# Patient Record
Sex: FEMALE | Race: WHITE | NOT HISPANIC OR LATINO
[De-identification: names, ages, dates, MRNs, and addresses within clinical notes are randomized per-mention and may not be internally consistent; named-entity substitution may affect disease eponyms.]

---

## 2017-04-24 ENCOUNTER — RESULT REVIEW (OUTPATIENT)
Age: 37
End: 2017-04-24

## 2017-05-26 PROBLEM — Z00.00 ENCOUNTER FOR PREVENTIVE HEALTH EXAMINATION: Noted: 2017-05-26

## 2017-05-31 ENCOUNTER — OUTPATIENT (OUTPATIENT)
Dept: OUTPATIENT SERVICES | Facility: HOSPITAL | Age: 37
LOS: 1 days | End: 2017-05-31
Payer: COMMERCIAL

## 2017-05-31 ENCOUNTER — APPOINTMENT (OUTPATIENT)
Dept: CT IMAGING | Facility: IMAGING CENTER | Age: 37
End: 2017-05-31

## 2017-05-31 DIAGNOSIS — Z00.8 ENCOUNTER FOR OTHER GENERAL EXAMINATION: ICD-10-CM

## 2017-05-31 PROCEDURE — 74177 CT ABD & PELVIS W/CONTRAST: CPT

## 2018-02-06 ENCOUNTER — APPOINTMENT (OUTPATIENT)
Dept: OBGYN | Facility: CLINIC | Age: 38
End: 2018-02-06

## 2018-02-15 ENCOUNTER — OUTPATIENT (OUTPATIENT)
Dept: OUTPATIENT SERVICES | Facility: HOSPITAL | Age: 38
LOS: 1 days | End: 2018-02-15
Payer: MEDICAID

## 2018-02-15 ENCOUNTER — APPOINTMENT (OUTPATIENT)
Dept: RADIOLOGY | Facility: IMAGING CENTER | Age: 38
End: 2018-02-15
Payer: MEDICAID

## 2018-02-15 DIAGNOSIS — Z00.8 ENCOUNTER FOR OTHER GENERAL EXAMINATION: ICD-10-CM

## 2018-02-15 PROCEDURE — 72070 X-RAY EXAM THORAC SPINE 2VWS: CPT | Mod: 26

## 2018-02-15 PROCEDURE — 71046 X-RAY EXAM CHEST 2 VIEWS: CPT

## 2018-02-15 PROCEDURE — 71046 X-RAY EXAM CHEST 2 VIEWS: CPT | Mod: 26

## 2018-02-15 PROCEDURE — 72070 X-RAY EXAM THORAC SPINE 2VWS: CPT

## 2019-12-10 ENCOUNTER — APPOINTMENT (OUTPATIENT)
Dept: ORTHOPEDIC SURGERY | Facility: CLINIC | Age: 39
End: 2019-12-10
Payer: MEDICAID

## 2019-12-10 VITALS
BODY MASS INDEX: 21.85 KG/M2 | DIASTOLIC BLOOD PRESSURE: 76 MMHG | HEIGHT: 64 IN | HEART RATE: 80 BPM | SYSTOLIC BLOOD PRESSURE: 128 MMHG | WEIGHT: 128 LBS

## 2019-12-10 DIAGNOSIS — Z78.9 OTHER SPECIFIED HEALTH STATUS: ICD-10-CM

## 2019-12-10 DIAGNOSIS — M25.512 PAIN IN RIGHT SHOULDER: ICD-10-CM

## 2019-12-10 DIAGNOSIS — M25.511 PAIN IN RIGHT SHOULDER: ICD-10-CM

## 2019-12-10 PROCEDURE — 73030 X-RAY EXAM OF SHOULDER: CPT | Mod: 50

## 2019-12-10 PROCEDURE — 20610 DRAIN/INJ JOINT/BURSA W/O US: CPT | Mod: LT

## 2019-12-10 PROCEDURE — 99204 OFFICE O/P NEW MOD 45 MIN: CPT | Mod: 25

## 2019-12-10 RX ORDER — MELOXICAM 15 MG/1
15 TABLET ORAL DAILY
Qty: 30 | Refills: 1 | Status: ACTIVE | COMMUNITY
Start: 2019-12-10 | End: 1900-01-01

## 2019-12-10 RX ORDER — LIDOCAINE HYDROCHLORIDE 10 MG/ML
1 INJECTION, SOLUTION INFILTRATION; PERINEURAL
Qty: 0 | Refills: 0 | Status: COMPLETED | OUTPATIENT
Start: 2019-12-10

## 2019-12-10 RX ORDER — METHYLPRED ACET/NACL,ISO-OS/PF 40 MG/ML
40 VIAL (ML) INJECTION
Qty: 1 | Refills: 0 | Status: COMPLETED | OUTPATIENT
Start: 2019-12-10

## 2019-12-10 RX ADMIN — METHYLPREDNISOLONE ACETATE MG/ML: 40 INJECTION, SUSPENSION INTRA-ARTICULAR; INTRALESIONAL; INTRAMUSCULAR; SOFT TISSUE at 00:00

## 2019-12-10 RX ADMIN — LIDOCAINE HYDROCHLORIDE %: 10 INJECTION, SOLUTION INFILTRATION; PERINEURAL at 00:00

## 2020-05-28 ENCOUNTER — APPOINTMENT (OUTPATIENT)
Dept: ORTHOPEDIC SURGERY | Facility: CLINIC | Age: 40
End: 2020-05-28
Payer: COMMERCIAL

## 2020-05-28 VITALS — SYSTOLIC BLOOD PRESSURE: 125 MMHG | HEART RATE: 66 BPM | DIASTOLIC BLOOD PRESSURE: 80 MMHG | TEMPERATURE: 98.1 F

## 2020-05-28 DIAGNOSIS — M23.92 UNSPECIFIED INTERNAL DERANGEMENT OF LEFT KNEE: ICD-10-CM

## 2020-05-28 PROCEDURE — 73564 X-RAY EXAM KNEE 4 OR MORE: CPT | Mod: LT

## 2020-05-28 PROCEDURE — 99214 OFFICE O/P EST MOD 30 MIN: CPT

## 2020-05-28 RX ORDER — MELOXICAM 15 MG/1
15 TABLET ORAL DAILY
Qty: 30 | Refills: 0 | Status: ACTIVE | COMMUNITY
Start: 2020-05-28 | End: 1900-01-01

## 2020-06-04 NOTE — HISTORY OF PRESENT ILLNESS
[de-identified] : This patient presents today for follow-up regarding internal derangement of the left knee.  The patient had a steroid injection to the left knee in December of last year.  That gave her significant relief of symptoms for about 4 months.  Her symptoms recurred about 1 month ago and have worsened.  Pain level is 5 out of 10 and worsens with activity and changes in the weather.  Pain is improved with rest.  Patient takes Advil intermittently with some improvement.

## 2020-06-04 NOTE — DISCUSSION/SUMMARY
[de-identified] : This patient presents for follow-up regarding internal derangement of the left knee.  She is having recurrence of her symptoms over the last month.  Physical exam reveals evidence of patellofemoral syndrome of the left knee.  I recommended a course of Mobic for 2 weeks as well as a course of physical therapy for the next month.  I will see the patient back in 1 month for reevaluation.  If symptoms do not improve I would consider a course of Visco supplementation at that time.  Instructions were given regarding ice analgesics and modification of activities.\par \par I recommended a course of Mobic, 15mg per day for the next 2 weeks.  The patient was given a prescription for the Mobic with directions to take it once daily with the first meal of the day.  They were instructed to stop the medicine and call the office if there are any adverse reactions to the medicine.

## 2020-06-04 NOTE — PHYSICAL EXAM
[de-identified] : The patient appears well nourished  and in no apparent distress.  The patient is alert and oriented to person, place, and time.   Affect and mood appear normal.    The head is normocephalic and atraumatic.  The eyes reveal normal sclera and extra ocular muscles are intact.   The neck appears normal with no jugular venous distention or masses noted.   Skin shows normal turgor with no evidence of eczema or psoriasis.  No respiratory distress noted.  The patient ambulates with a normal gait.\par \par The left knee has normal range of motion. There is no pain with terminal flexion. There is some mild tenderness over the medial joint.  There is crepitus with range of motion.. . There is a negative [facility] sign. There is no effusion. There is no soft tissue swelling, warmth, or erythema.   There is a negative Lachman sign.  There is no instability to varus/valgus stress or anterior/posterior drawer.  There is normal strength in the in the quadriceps and hamstring muscles.  Sensation is intact to the lower estremity. There are normal pulses distally and good capillary refill. No edema or lymphadenopathy noted.  \par \par The right and left shoulders have full range of motion.  There is no soft tissue swelling.  There is no tenderness to palpation. There is no eccyhmosis.  There is no erythema or warmth.   Thee is a negative impingement sign.  There is a negative Hawkings sign.   Rotator cuff strength is normal.  Negative load and shift test.  Negative anterior and posterior drawer test.   Negative apprehension sign.  No lymphadenopathy or edema is noted.  Pulses and capillary refill are normal.  Sensation is normal.    \par  [de-identified] : AP, lateral, tunnel, and merchant views of the left knee were obtained.  Medial and lateral joint spaces are well maintained.  There is patella natan with mild lateral patellofemoral joint narrowing.  The alignment of the knee is normal.  No fractures or dislocations are noted.

## 2020-06-29 ENCOUNTER — APPOINTMENT (OUTPATIENT)
Dept: ORTHOPEDIC SURGERY | Facility: CLINIC | Age: 40
End: 2020-06-29
Payer: MEDICAID

## 2020-06-29 VITALS
HEIGHT: 64 IN | WEIGHT: 128 LBS | DIASTOLIC BLOOD PRESSURE: 76 MMHG | SYSTOLIC BLOOD PRESSURE: 115 MMHG | HEART RATE: 71 BPM | TEMPERATURE: 97.5 F | BODY MASS INDEX: 21.85 KG/M2

## 2020-06-29 PROCEDURE — 99213 OFFICE O/P EST LOW 20 MIN: CPT

## 2020-06-30 RX ORDER — HYALURONATE SODIUM 30 MG/2 ML
30 SYRINGE (ML) INTRAARTICULAR
Qty: 4 | Refills: 0 | Status: ACTIVE | OUTPATIENT
Start: 2020-06-30

## 2020-06-30 NOTE — DISCUSSION/SUMMARY
[de-identified] : The patient presents today for evaluation regarding patellofemoral syndrome of the left knee and internal derangement.  Patient continues to have pain about the knee despite meloxicam and physical therapy.  The patient likely has some early patellofemoral arthritis in conjunction with the patellofemoral stress syndrome of the left knee.  I recommended a course of Visco supplementation for the patient.  We will request this from the patient's insurance carrier and call her when the medicine is received to begin treatment in the office.  In the meantime, she can continue physical therapy.

## 2020-06-30 NOTE — PHYSICAL EXAM
[de-identified] : The patient appears well nourished  and in no apparent distress.  The patient is alert and oriented to person, place, and time.   Affect and mood appear normal.    The head is normocephalic and atraumatic.  The eyes reveal normal sclera and extra ocular muscles are intact.   The neck appears normal with no jugular venous distention or masses noted.   Skin shows normal turgor with no evidence of eczema or psoriasis.  No respiratory distress noted.  The patient ambulates with a normal gait.\par \par The left knee has normal range of motion. There is no pain with terminal flexion.  There is tenderness over the medial joint and over the lateral aspect of the patellofemoral joint.  There is crepitus with range of motion.. . There is a negative Obie sign. There is no effusion. There is no soft tissue swelling, warmth, or erythema.   There is a negative Lachman sign.  There is no instability to varus/valgus stress or anterior/posterior drawer.  There is normal strength in the in the quadriceps and hamstring muscles.  Sensation is intact to the lower estremity. There are normal pulses distally and good capillary refill. No edema or lymphadenopathy noted.  \par \par The right and left shoulders have full range of motion.  There is no soft tissue swelling.  There is no tenderness to palpation. There is no eccyhmosis.  There is no erythema or warmth.   Thee is a negative impingement sign.  There is a negative Hawkings sign.   Rotator cuff strength is normal.  Negative load and shift test.  Negative anterior and posterior drawer test.   Negative apprehension sign.  No lymphadenopathy or edema is noted.  Pulses and capillary refill are normal.  Sensation is normal.    \par

## 2020-06-30 NOTE — REASON FOR VISIT
[Follow-Up Visit] : a follow-up visit for [FreeTextEntry2] : Internal derangement/patellofemoral stress syndrome of left knee.

## 2020-06-30 NOTE — HISTORY OF PRESENT ILLNESS
[de-identified] : This patient presents today for follow-up regarding left knee patellofemoral syndrome and internal derangement.  She is taking the meloxicam once a day and hard to tell whether or not she is getting relief.  Pain is intermittent.  Pain level 4-5 out of 10.  The patient states that the pain is worse with activity and improved by rest. The patient denies numbness and tingling, radicular symptoms, or bowel/bladder dysfunction.  Patient notes stiffness about the left knee as well.  She is gone for 8 sessions of physical therapy thus far.

## 2020-11-24 ENCOUNTER — APPOINTMENT (OUTPATIENT)
Dept: ORTHOPEDIC SURGERY | Facility: CLINIC | Age: 40
End: 2020-11-24
Payer: MEDICAID

## 2020-11-24 VITALS — TEMPERATURE: 97.5 F | HEART RATE: 82 BPM | SYSTOLIC BLOOD PRESSURE: 122 MMHG | DIASTOLIC BLOOD PRESSURE: 76 MMHG

## 2020-11-24 PROCEDURE — 20610 DRAIN/INJ JOINT/BURSA W/O US: CPT | Mod: LT

## 2020-11-24 RX ORDER — HYALURONATE SODIUM 30 MG/2 ML
30 SYRINGE (ML) INTRAARTICULAR
Refills: 0 | Status: COMPLETED | OUTPATIENT
Start: 2020-11-24

## 2020-11-24 RX ADMIN — Medication 2 MG/2ML: at 00:00

## 2020-12-02 ENCOUNTER — APPOINTMENT (OUTPATIENT)
Dept: ORTHOPEDIC SURGERY | Facility: CLINIC | Age: 40
End: 2020-12-02
Payer: MEDICAID

## 2020-12-02 VITALS — DIASTOLIC BLOOD PRESSURE: 81 MMHG | TEMPERATURE: 97.7 F | SYSTOLIC BLOOD PRESSURE: 117 MMHG | HEART RATE: 71 BPM

## 2020-12-02 PROCEDURE — 20610 DRAIN/INJ JOINT/BURSA W/O US: CPT | Mod: LT

## 2020-12-02 PROCEDURE — 99072 ADDL SUPL MATRL&STAF TM PHE: CPT

## 2020-12-03 RX ORDER — HYALURONATE SODIUM 30 MG/2 ML
30 SYRINGE (ML) INTRAARTICULAR
Refills: 0 | Status: COMPLETED | OUTPATIENT
Start: 2020-12-03

## 2020-12-03 RX ADMIN — Medication 2 MG/2ML: at 00:00

## 2020-12-08 ENCOUNTER — APPOINTMENT (OUTPATIENT)
Dept: ORTHOPEDIC SURGERY | Facility: CLINIC | Age: 40
End: 2020-12-08
Payer: COMMERCIAL

## 2020-12-08 VITALS
SYSTOLIC BLOOD PRESSURE: 120 MMHG | WEIGHT: 128 LBS | DIASTOLIC BLOOD PRESSURE: 75 MMHG | TEMPERATURE: 97.3 F | HEIGHT: 64 IN | HEART RATE: 75 BPM | BODY MASS INDEX: 21.85 KG/M2

## 2020-12-08 DIAGNOSIS — M22.2X2 PATELLOFEMORAL DISORDERS, LEFT KNEE: ICD-10-CM

## 2020-12-08 PROCEDURE — 99072 ADDL SUPL MATRL&STAF TM PHE: CPT

## 2020-12-08 PROCEDURE — 20610 DRAIN/INJ JOINT/BURSA W/O US: CPT | Mod: LT

## 2022-02-16 ENCOUNTER — APPOINTMENT (OUTPATIENT)
Dept: ORTHOPEDIC SURGERY | Facility: CLINIC | Age: 42
End: 2022-02-16
Payer: COMMERCIAL

## 2022-02-16 VITALS
SYSTOLIC BLOOD PRESSURE: 142 MMHG | HEART RATE: 74 BPM | BODY MASS INDEX: 21.85 KG/M2 | DIASTOLIC BLOOD PRESSURE: 86 MMHG | WEIGHT: 128 LBS | HEIGHT: 64 IN

## 2022-02-16 DIAGNOSIS — M54.9 DORSALGIA, UNSPECIFIED: ICD-10-CM

## 2022-02-16 DIAGNOSIS — M47.812 SPONDYLOSIS W/OUT MYELOPATHY OR RADICULOPATHY, CERVICAL REGION: ICD-10-CM

## 2022-02-16 DIAGNOSIS — M54.2 CERVICALGIA: ICD-10-CM

## 2022-02-16 PROCEDURE — 99204 OFFICE O/P NEW MOD 45 MIN: CPT

## 2022-02-16 RX ORDER — OMEPRAZOLE 40 MG/1
40 CAPSULE, DELAYED RELEASE ORAL
Qty: 30 | Refills: 1 | Status: ACTIVE | COMMUNITY
Start: 2022-02-16 | End: 1900-01-01

## 2022-02-16 RX ORDER — IBUPROFEN 800 MG/1
800 TABLET, FILM COATED ORAL
Qty: 90 | Refills: 0 | Status: ACTIVE | COMMUNITY
Start: 2022-02-16 | End: 1900-01-01

## 2022-02-17 NOTE — DISCUSSION/SUMMARY
[Medication Risks Reviewed] : Medication risks reviewed [de-identified] : I recommended that she stop with his pain management doctor.  We discussed daily activities she needs to avoid which may well be aggravating and propagating the symptoms.  She will use moist heat.  She has been started on ibuprofen 800 mg 3 times a day as a nonsteroidal anti-inflammatory.  She will call if there are problems with the medication or worsening of her symptoms and I will see her for follow-up in 4 weeks.

## 2022-02-17 NOTE — HISTORY OF PRESENT ILLNESS
[de-identified] : This 41-year-old woman started with symptoms of neck pain 3 years ago.  The symptoms were intermittent.  At one point she had a 4-month course of physical therapy without benefit.  The left neck pain over the last year or so has gotten worse and radiates towards the left shoulder but not down the arm.  She has not had associated neurologic symptoms of numbness, paresthesias or weakness.  There is no Valsalva effect.  There have been no changes in her gait or balance.  She gets occasional night pain.  She came under the care of a pain management doctor and was using Voltaren cream and lidocaine patches as well as Tylenol.  She then had an MRI of the cervical spine and had 2 epidurals without help.  She has been getting multiple local injections in a few days ago after the injections the pain became markedly worse in the left neck radiating towards the shoulder.  She does have some daily habits that may well aggravate and propagate neck related pain including watching TV in bed, sleeping with 2 or more pillows, lying on the couch watching TV and falling asleep on the couch, carrying an 8 pound shoulder bag and holding the phone with her head while she talks. [Pain Location] : pain [Worsening] : worsening [8] : a maximum pain level of 8/10

## 2022-02-17 NOTE — PHYSICAL EXAM
[de-identified] : She is fully alert and oriented with a normal mood and affect.  She is in no acute distress as I take the history.  She ambulates with a normal gait including tiptoe and heel walking.  There is no evidence of unsteadiness or spasticity.  There are no cutaneous abnormalities or palpable bony defects of the spine.  There is no evidence of shortness of breath or respiratory distress.  Forward flexion of the spine is painless.  There is no paravertebral muscle spasm, sciatic notch tenderness or trochanteric tenderness.  Her lower extremity neurological examination revealed 1-2+ symmetrical reflexes with downgoing toes.  Motor power is normal manual testing in all lower extremity groups and sensation is normal to light touch in all dermatomes.  Straight leg raising is negative to 90 degrees in the sitting position.  Her hips and her knees have a full and painless range of motion with normal stability.  Vascular examination shows no evidence of varicosities and there is no lymphedema.  There are no cutaneous abnormalities of the upper or lower extremities.  Her upper extremity neurological examination revealed 1+ symmetrical reflexes.  Motor power is normal to manual testing in all upper extremity groups and sensation is normal to light touch in all dermatomes.  Shoulder range of motion is full, painless and symmetrical.  Range of motion of the cervical spine showed flexion with the chin reaching to within 2 fingerbreadths of the chest, extension of 80 degrees with rotation of 75 degrees bilaterally and tilt of 30 degrees bilaterally with some discomfort at the extremes. [de-identified] : I reviewed an outside MRI of the cervical spine that revealed some minor disc bulge at 1 level.  There is no evidence of any significant spinal cord or nerve root compression.  Sagittal alignment is normal and there are no destructive changes. English

## 2022-02-17 NOTE — REVIEW OF SYSTEMS
[Negative] : Genitourinary [FreeTextEntry7] : She had a stomach ulcer 5 or 6 years ago but has not had GI problems for many years.

## 2022-03-16 ENCOUNTER — APPOINTMENT (OUTPATIENT)
Dept: ORTHOPEDIC SURGERY | Facility: CLINIC | Age: 42
End: 2022-03-16

## 2022-04-22 ENCOUNTER — RX RENEWAL (OUTPATIENT)
Age: 42
End: 2022-04-22

## 2023-04-12 ENCOUNTER — APPOINTMENT (OUTPATIENT)
Dept: ULTRASOUND IMAGING | Facility: CLINIC | Age: 43
End: 2023-04-12
Payer: COMMERCIAL

## 2023-04-12 ENCOUNTER — OUTPATIENT (OUTPATIENT)
Dept: OUTPATIENT SERVICES | Facility: HOSPITAL | Age: 43
LOS: 1 days | End: 2023-04-12
Payer: COMMERCIAL

## 2023-04-12 DIAGNOSIS — Z00.8 ENCOUNTER FOR OTHER GENERAL EXAMINATION: ICD-10-CM

## 2023-04-12 PROCEDURE — 76700 US EXAM ABDOM COMPLETE: CPT | Mod: 26

## 2023-04-12 PROCEDURE — 76700 US EXAM ABDOM COMPLETE: CPT

## 2025-07-14 ENCOUNTER — APPOINTMENT (OUTPATIENT)
Dept: ORTHOPEDIC SURGERY | Facility: CLINIC | Age: 45
End: 2025-07-14
Payer: COMMERCIAL

## 2025-07-14 PROCEDURE — 99204 OFFICE O/P NEW MOD 45 MIN: CPT

## 2025-07-14 PROCEDURE — 73610 X-RAY EXAM OF ANKLE: CPT | Mod: LT

## 2025-07-14 RX ORDER — MELOXICAM 15 MG/1
15 TABLET ORAL DAILY
Qty: 30 | Refills: 3 | Status: ACTIVE | COMMUNITY
Start: 2025-07-14 | End: 1900-01-01